# Patient Record
Sex: FEMALE | Race: WHITE
[De-identification: names, ages, dates, MRNs, and addresses within clinical notes are randomized per-mention and may not be internally consistent; named-entity substitution may affect disease eponyms.]

---

## 2018-01-12 ENCOUNTER — HOSPITAL ENCOUNTER (EMERGENCY)
Dept: HOSPITAL 24 - ER | Age: 58
Discharge: HOME | End: 2018-01-12
Payer: COMMERCIAL

## 2018-01-12 VITALS
SYSTOLIC BLOOD PRESSURE: 194 MMHG | SYSTOLIC BLOOD PRESSURE: 194 MMHG | DIASTOLIC BLOOD PRESSURE: 87 MMHG | DIASTOLIC BLOOD PRESSURE: 87 MMHG

## 2018-01-12 VITALS — BODY MASS INDEX: 58.2 KG/M2

## 2018-01-12 DIAGNOSIS — G25.81: Primary | ICD-10-CM

## 2018-01-12 DIAGNOSIS — M19.90: ICD-10-CM

## 2018-01-12 PROCEDURE — 99281 EMR DPT VST MAYX REQ PHY/QHP: CPT

## 2018-01-12 NOTE — DR.GENAD
HPI





- PCP


Primary Care Physician: VERENA





- HPI Comment


HPI Comment: WORSE TODAY.





- Complaint/Symptoms


Chief Complaint Doctors Comments: HISTORY ARTHRITIS AND RESTLESS LEG. OUT OF 

MED. INCREASING PAIN.


Chief Complaint:: LEG PAIN, RESTLESS LEG AND BACK PAIN OUT OF REQUIP. SEES DR. ALBARADO AND HAS NOT HAD MEDS FOR LAST FEW DAYS.





- Nurses notes reviewed


Nurses Notes Review: Yes





- Source


History Provided: Patient





- Mode of Arrival


Mode of Arrival: Ambulatory





- Timing


Onset of Chief Complaint: 01/10/18


Came on: Suddenly





- Duration


Duration: Constant


Duration: Days





- Severity


Severity: Moderate





PMH





- PMH


Past Medical History: Yes


Past Medical History: Arthritis, GERD, Hypertension


Past Surgical History: Yes


Surgical History: CABG/Valve Surgery, Cholecystectomy, Hysterectomy, 

Thyroidectomy





- Family History


History of Family Medical Conditions: Yes


Family Medical History: Diabetes Mellitus, MI, Coronary Artery Disease, Sudden 

Cardiac Death, Hypertension





- Social History


Do you use any recreational Drugs:: No





- infectious screening


In the last 2 months have you had wt loss of >10#?: NO


Have you had fever, night sweats or hemotysis?: No


Have you traveled outside the country in the last 6 months?: No


Isolation: Standard





ROS





- Review of Systems


Constitutional: No Symptoms Reported


Eyes: No Symptoms Reported


ENTM: No Symptoms Reported


Respiratoy: No Symptoms Reported


Cardiovascular: No Symptoms Reported


Gastrointestinal/Abdominal: No Symptoms Reported


Genitourinary: No Symptoms Reported


Neurological: Numbness


Musculoskeletal: Joint Pain, Muscle Pain


Integumentary: No Symptoms Reported


Hematologic/Lymphatic: No Symptoms Reported


Endocrine: No Symptoms Reported


All Other Systems: Reviewed and Negative





PE





- Vital Signs


Vitals: 


 





Temperature                      98.3 F


Pulse Rate                       63


Respiratory Rate                 18


Blood Pressure                   194/87


O2 Sat by Pulse Oximetry         96











- General


Limitations: No Limitations


General Appearance: Alert





- Head


Head Exam: Normal Inspection





- Eyes


Eye exam: Normal Appearance





- ENT


ENT Exam: Normal  External Ear Exam


External Ear Exam: Normal External Inspection


TM/Canal Exam: Bilateral Normal


Nose Exam: Normal Nose Exam


Mouth Exam: Normal Inspection


Throat Exam: Normal Inspection





- Neck


Neck Exam: Trachea Midline





- Chest


Chest Inspection: Symmetric Chest Wall Rise





- Respiratory


Respiratory Exam: Normal Lung Sounds Bilat


Respiratory Exam: Bilateral Clear to Auscultation





- Cardiovascular


Cardiovascular Exam: Regular Rate, Normal Rhythm, Normal Heart Sounds





- Abdominal Exam


Abdominal Exam: Normal Bowel Sounds, Soft.  negative: Tenderness





- Extremities


Extremities Exam: Tenderness (LOWER AND UPPER EXTREMITY JOINTS.)





- Back


Back Exam: Paraspinal Tenderness





- Neurologic


Neurological Exam: Alert, Oriented X3





- Psychiatric


Psychiatric Exam: Normal Affect, Normal Mood





- Skin


Skin Exam: Normal Color





MDM





- Differential Diagnosis


Differential Diagnosis: ARTHRITIS, RESTLESS LEG SYNDROME.





Course





- Treatment


Treatment: SEE ORDERS.





- Education/Counseling


Education/Counseling: Patient, Education


Educated On: Diagnosis, Needs for Follow Up





- Diagnosis


Discharge Problem: 


 Restless leg syndrome, Arthritis








- Discharge Plan


Disposition:  HOME, SELF-CARE


Condition: Stable


Prescriptions: 


Cyclobenzaprine HCl [FLEXERIL 10 MG *] 10 mg PO TID #60 tab


Ropinirole HCl [Requip] 5 mg PO HS #10 tab


Tramadol HCl 50 mg PO Q8H PRN #15 tablet


 PRN Reason: 





- Follow ups/Referrals


Follow ups/Referrals: 


[facility],None [Primary Care Provider] - 3 days





- Instructions


Instructions:  Restless Legs Syndrome, Back Pain, Adult, Easy-to-Read


Additional Instructions: 


RETURN TO ED IF WORSE.

## 2018-02-01 ENCOUNTER — HOSPITAL ENCOUNTER (OUTPATIENT)
Dept: HOSPITAL 24 - LAB | Age: 58
End: 2018-02-01
Attending: FAMILY MEDICINE
Payer: COMMERCIAL

## 2018-02-01 DIAGNOSIS — E66.01: ICD-10-CM

## 2018-02-01 DIAGNOSIS — E03.8: ICD-10-CM

## 2018-02-01 DIAGNOSIS — G25.81: Primary | ICD-10-CM

## 2018-02-01 DIAGNOSIS — R60.0: ICD-10-CM

## 2018-02-01 LAB
ALBUMIN SERPL BCP-MCNC: 3.6 G/DL (ref 3.4–5)
ALP SERPL-CCNC: 118 UNITS/L (ref 46–116)
ALT SERPL W P-5'-P-CCNC: 22 UNITS/L (ref 12–78)
AST SERPL W P-5'-P-CCNC: 22 UNITS/L (ref 15–37)
BASOPHILS # BLD AUTO: 0.1 X10^3/UL (ref 0–0.1)
BASOPHILS NFR BLD AUTO: 1.3 % (ref 0.2–1)
BUN SERPL-MCNC: 9 MG/DL (ref 7–18)
CALCIUM ALBUM COR SERPL-SCNC: (no result) MG/DL (ref 8.5–10.1)
CALCIUM ALBUM COR SERPL-SCNC: (no result) MMOL/L (ref 136–145)
CALCIUM SERPL-MCNC: 8.5 MG/DL (ref 8.5–10.1)
CHLORIDE SERPL-SCNC: 105 MMOL/L (ref 98–107)
CO2 SERPL-SCNC: 27.8 MMOL/L (ref 21–32)
CREAT SERPL-MCNC: 0.66 MG/DL (ref 0.55–1.02)
EGFR  BLACK RACES: > 60 (ref 60–?)
EOSINOPHIL # BLD AUTO: 0.1 X10^3/UL (ref 0–0.2)
EOSINOPHIL NFR BLD AUTO: 2.2 % (ref 0.9–2.9)
ERYTHROCYTE [DISTWIDTH] IN BLOOD BY AUTOMATED COUNT: 14.2 % (ref 11.6–16.5)
HCT VFR BLD AUTO: 42 % (ref 36–47)
HGB BLD-MCNC: 14.5 G/DL (ref 12–16)
LYMPHOCYTES # BLD AUTO: 1.4 X10^3/UL (ref 1.3–2.9)
LYMPHOCYTES NFR BLD AUTO: 21.4 % (ref 21–51)
MCH RBC QN AUTO: 28.4 PG (ref 27–34)
MCHC RBC AUTO-ENTMCNC: 34.5 G/DL (ref 33–35)
MCV RBC AUTO: 82.4 FL (ref 80–100)
MONOCYTES # BLD AUTO: 0.4 X10^3/UL (ref 0.3–0.8)
MONOCYTES NFR BLD AUTO: 6.9 % (ref 0–13)
NEUTROPHILS # BLD AUTO: 4.3 X10^3/UL (ref 2.2–4.8)
NEUTROPHILS NFR BLD AUTO: 68.2 % (ref 42–75)
PLATELET # BLD: 329 X10^3/UL (ref 150–450)
PMV BLD AUTO: 7.9 FL (ref 7.4–11)
PROT SERPL-MCNC: 7.6 G/DL (ref 6.4–8.2)
RBC # BLD AUTO: 5.09 X10^6/UL (ref 3.5–5.4)
SODIUM SERPL-SCNC: 141 MMOL/L (ref 136–145)
TSH SERPL DL<=0.05 MIU/L-ACNC: 44.97 UIU/ML (ref 0.36–3.74)
WBC NRBC COR # BLD AUTO: 6.3 X10^3/UL (ref 3.6–10)

## 2018-02-01 PROCEDURE — 82728 ASSAY OF FERRITIN: CPT

## 2018-02-01 PROCEDURE — 84481 FREE ASSAY (FT-3): CPT

## 2018-02-01 PROCEDURE — 84443 ASSAY THYROID STIM HORMONE: CPT

## 2018-02-01 PROCEDURE — 86376 MICROSOMAL ANTIBODY EACH: CPT

## 2018-02-01 PROCEDURE — 93970 EXTREMITY STUDY: CPT

## 2018-02-01 PROCEDURE — 84482 T3 REVERSE: CPT

## 2018-02-01 PROCEDURE — 85025 COMPLETE CBC W/AUTO DIFF WBC: CPT

## 2018-02-01 PROCEDURE — 80053 COMPREHEN METABOLIC PANEL: CPT

## 2018-02-01 PROCEDURE — 36415 COLL VENOUS BLD VENIPUNCTURE: CPT

## 2018-02-01 PROCEDURE — 85378 FIBRIN DEGRADE SEMIQUANT: CPT

## 2018-02-01 PROCEDURE — 86800 THYROGLOBULIN ANTIBODY: CPT

## 2018-02-01 NOTE — VAS
HISTORY: Left leg edema.



Study:  Bilateral lower extremity duplex venous ultrasound.



Comparison: None.



TECHNIQUE:  Multiple gray scale and color flow Doppler images of the deep venous system were obtained
 of the right and left lower extremity.



FINDINGS:  



The deep venous system of the right and left lower extremities were evaluated from the level of the c
ommon femoral vein through the popliteal vein.  Normal color flow and augmentation can be observed.  
In addition, normal compression is seen throughout the deep venous system.

 

IMPRESSION: 

 

Negative for bilateral lower extremity DVT.







Reported By:Electronically Signed by CHRISTAL SARKAR MD at 2/1/2018 2:52:21 PM